# Patient Record
Sex: MALE | NOT HISPANIC OR LATINO | URBAN - METROPOLITAN AREA
[De-identification: names, ages, dates, MRNs, and addresses within clinical notes are randomized per-mention and may not be internally consistent; named-entity substitution may affect disease eponyms.]

---

## 2023-07-19 ENCOUNTER — OFFICE VISIT (OUTPATIENT)
Dept: URBAN - METROPOLITAN AREA CLINIC 57 | Facility: CLINIC | Age: 83
End: 2023-07-19
Payer: MEDICARE

## 2023-07-19 ENCOUNTER — WEB ENCOUNTER (OUTPATIENT)
Dept: URBAN - METROPOLITAN AREA CLINIC 57 | Facility: CLINIC | Age: 83
End: 2023-07-19

## 2023-07-19 ENCOUNTER — DASHBOARD ENCOUNTERS (OUTPATIENT)
Age: 83
End: 2023-07-19

## 2023-07-19 VITALS
WEIGHT: 183 LBS | TEMPERATURE: 98.6 F | BODY MASS INDEX: 28.72 KG/M2 | HEART RATE: 60 BPM | HEIGHT: 67 IN | OXYGEN SATURATION: 98 % | SYSTOLIC BLOOD PRESSURE: 110 MMHG | DIASTOLIC BLOOD PRESSURE: 60 MMHG

## 2023-07-19 DIAGNOSIS — R14.0 BLOATING: ICD-10-CM

## 2023-07-19 DIAGNOSIS — R19.4 CHANGE IN BOWEL HABIT: ICD-10-CM

## 2023-07-19 PROCEDURE — 99203 OFFICE O/P NEW LOW 30 MIN: CPT | Performed by: INTERNAL MEDICINE

## 2023-07-19 RX ORDER — ASPIRIN 81 MG/1
1 TABLET TABLET, CHEWABLE ORAL ONCE A DAY
Qty: 30 | Status: ACTIVE | COMMUNITY
Start: 2023-07-19

## 2023-07-19 RX ORDER — OLMESARTAN MEDOXOMIL 20 MG/1
TABLET, FILM COATED ORAL
Qty: 90 TABLET | Status: ACTIVE | COMMUNITY

## 2023-07-19 RX ORDER — BLOOD SUGAR DIAGNOSTIC
TEST BLOOD SUGAR ONCE DAILY E11.9 STRIP MISCELLANEOUS
Qty: 100 EACH | Refills: 4 | Status: ACTIVE | COMMUNITY

## 2023-07-19 RX ORDER — FLUTICASONE PROPIONATE 50 UG/1
SPRAY, METERED NASAL
Qty: 48 GRAM | Status: ACTIVE | COMMUNITY

## 2023-07-19 RX ORDER — DULAGLUTIDE 0.75 MG/.5ML
INJECT 0.75 MG SUBCUTANEOUSLY EVERY WEEK AS DIRECTED INJECTION, SOLUTION SUBCUTANEOUS
Qty: 2 MILLILITER | Refills: 1 | Status: ACTIVE | COMMUNITY

## 2023-07-19 RX ORDER — METOPROLOL SUCCINATE 50 MG/1
TAKE ONE HALF TAB BY MOUTH EVERY DAY TABLET, EXTENDED RELEASE ORAL
Qty: 45 EACH | Refills: 2 | Status: ACTIVE | COMMUNITY

## 2023-07-19 RX ORDER — METFORMIN HYDROCHLORIDE 1000 MG/1
TABLET, FILM COATED ORAL
Qty: 180 TABLET | Status: ACTIVE | COMMUNITY

## 2023-07-19 RX ORDER — FLASH GLUCOSE SENSOR
USE 1 SENSOR EVERY 14 DAYS DX E11.65 KIT MISCELLANEOUS
Qty: 4 EACH | Refills: 2 | Status: ACTIVE | COMMUNITY

## 2023-07-19 RX ORDER — LEVOTHYROXINE SODIUM 50 UG/1
TABLET ORAL
Qty: 90 TABLET | Status: ACTIVE | COMMUNITY

## 2023-07-19 RX ORDER — PRIMIDONE 50 MG/1
TABLET ORAL
Qty: 90 TABLET | Status: ACTIVE | COMMUNITY

## 2023-07-19 RX ORDER — ESCITALOPRAM 20 MG/1
TABLET, FILM COATED ORAL
Qty: 90 TABLET | Status: ACTIVE | COMMUNITY

## 2023-07-19 RX ORDER — EMPAGLIFLOZIN 10 MG/1
TAKE 1 TABLET BY MOUTH EVERY DAY TABLET, FILM COATED ORAL
Qty: 90 EACH | Refills: 0 | Status: ACTIVE | COMMUNITY

## 2023-07-19 RX ORDER — ATORVASTATIN CALCIUM 80 MG/1
TABLET, FILM COATED ORAL
Qty: 90 TABLET | Status: ACTIVE | COMMUNITY

## 2023-07-19 RX ORDER — POTASSIUM CHLORIDE 10 MEQ/1
TABLET, EXTENDED RELEASE ORAL
Qty: 90 TABLET | Status: ACTIVE | COMMUNITY

## 2023-07-19 NOTE — HPI-TODAY'S VISIT:
Here for evaluation of excess flatulence which has been ongoing for the last 2 months. States that everything he eats or drinks gives him gas. He has tried cutting out dairy from his diet has not helped. He has tried multiple OTC medications such as Gas-X which have not helped. No heartburn or indigestion reported. Bowel movements are regular and soft. He does report drinking at least 20 ounces of soda daily, has been drinking Gatorade 0 instead of water. He also been lax with his diabetic control and eating some ice cream. He is up-to-date with his colonoscopy the last 1 being in the last within the last 10 years. No unusual weight loss reported.